# Patient Record
Sex: FEMALE | Race: WHITE | NOT HISPANIC OR LATINO | URBAN - NONMETROPOLITAN AREA
[De-identification: names, ages, dates, MRNs, and addresses within clinical notes are randomized per-mention and may not be internally consistent; named-entity substitution may affect disease eponyms.]

---

## 2020-12-04 ENCOUNTER — DOCTOR'S OFFICE (OUTPATIENT)
Dept: URBAN - NONMETROPOLITAN AREA CLINIC 1 | Facility: CLINIC | Age: 55
Setting detail: OPHTHALMOLOGY
End: 2020-12-04
Payer: COMMERCIAL

## 2020-12-04 ENCOUNTER — RX ONLY (RX ONLY)
Age: 55
End: 2020-12-04

## 2020-12-04 DIAGNOSIS — H40.033: ICD-10-CM

## 2020-12-04 DIAGNOSIS — S05.01XA: ICD-10-CM

## 2020-12-04 PROCEDURE — 92004 COMPRE OPH EXAM NEW PT 1/>: CPT | Performed by: OPHTHALMOLOGY

## 2020-12-04 ASSESSMENT — CONFRONTATIONAL VISUAL FIELD TEST (CVF)
OS_FINDINGS: FULL
OD_FINDINGS: FULL

## 2020-12-04 ASSESSMENT — KERATOMETRY
OD_K1POWER_DIOPTERS: 40.87
OD_AXISANGLE_DEGREES: 9
OS_K2POWER_DIOPTERS: 41.00
OS_AXISANGLE_DEGREES: 133
OS_K1POWER_DIOPTERS: 40.50
OD_K2POWER_DIOPTERS: 41.12

## 2020-12-04 ASSESSMENT — REFRACTION_AUTOREFRACTION
OS_AXIS: 80
OD_AXIS: 70
OD_CYLINDER: -0.75
OS_CYLINDER: -0.75
OD_SPHERE: -0.50
OS_SPHERE: -0.12

## 2020-12-04 ASSESSMENT — VISUAL ACUITY
OS_BCVA: 20/60-2
OD_BCVA: 20/25

## 2020-12-04 ASSESSMENT — SPHEQUIV_DERIVED
OS_SPHEQUIV: -0.495
OD_SPHEQUIV: -0.875

## 2020-12-04 ASSESSMENT — AXIALLENGTH_DERIVED
OD_AL: 24.92
OS_AL: 24.86

## 2020-12-08 ENCOUNTER — DOCTOR'S OFFICE (OUTPATIENT)
Dept: URBAN - NONMETROPOLITAN AREA CLINIC 1 | Facility: CLINIC | Age: 55
Setting detail: OPHTHALMOLOGY
End: 2020-12-08
Payer: COMMERCIAL

## 2020-12-08 ENCOUNTER — RX ONLY (RX ONLY)
Age: 55
End: 2020-12-08

## 2020-12-08 DIAGNOSIS — S05.01XA: ICD-10-CM

## 2020-12-08 PROCEDURE — 65778 COVER EYE W/MEMBRANE: CPT | Performed by: OPHTHALMOLOGY

## 2020-12-08 ASSESSMENT — CONFRONTATIONAL VISUAL FIELD TEST (CVF)
OD_FINDINGS: FULL
OS_FINDINGS: FULL

## 2020-12-08 ASSESSMENT — KERATOMETRY
OD_K1POWER_DIOPTERS: 40.87
OS_K1POWER_DIOPTERS: 40.50
OD_K2POWER_DIOPTERS: 41.12
OD_AXISANGLE_DEGREES: 009
OS_K2POWER_DIOPTERS: 41.00
OS_AXISANGLE_DEGREES: 133

## 2020-12-08 ASSESSMENT — REFRACTION_AUTOREFRACTION
OD_SPHERE: -0.75
OD_CYLINDER: -0.25
OD_AXIS: 091
OS_AXIS: 054
OS_SPHERE: 0.00
OS_CYLINDER: -0.50

## 2020-12-08 ASSESSMENT — VISUAL ACUITY
OS_BCVA: 20/50-1
OD_BCVA: 20/20

## 2020-12-08 ASSESSMENT — SPHEQUIV_DERIVED
OS_SPHEQUIV: -0.25
OD_SPHEQUIV: -0.875

## 2020-12-08 ASSESSMENT — AXIALLENGTH_DERIVED
OD_AL: 24.92
OS_AL: 24.754

## 2020-12-15 ENCOUNTER — DOCTOR'S OFFICE (OUTPATIENT)
Dept: URBAN - NONMETROPOLITAN AREA CLINIC 1 | Facility: CLINIC | Age: 55
Setting detail: OPHTHALMOLOGY
End: 2020-12-15
Payer: COMMERCIAL

## 2020-12-15 DIAGNOSIS — H40.033: ICD-10-CM

## 2020-12-15 DIAGNOSIS — S05.01XD: ICD-10-CM

## 2020-12-15 PROCEDURE — 99024 POSTOP FOLLOW-UP VISIT: CPT | Performed by: OPHTHALMOLOGY

## 2020-12-15 ASSESSMENT — SPHEQUIV_DERIVED
OS_SPHEQUIV: -0.25
OD_SPHEQUIV: -0.875

## 2020-12-15 ASSESSMENT — AXIALLENGTH_DERIVED
OS_AL: 24.754
OD_AL: 24.92

## 2020-12-15 ASSESSMENT — REFRACTION_AUTOREFRACTION
OS_CYLINDER: -0.50
OD_CYLINDER: -0.25
OD_AXIS: 091
OS_SPHERE: 0.00
OD_SPHERE: -0.75
OS_AXIS: 054

## 2020-12-15 ASSESSMENT — KERATOMETRY
OS_K2POWER_DIOPTERS: 41.00
OD_K1POWER_DIOPTERS: 40.87
OD_K2POWER_DIOPTERS: 41.12
OS_AXISANGLE_DEGREES: 133
OS_K1POWER_DIOPTERS: 40.50
OD_AXISANGLE_DEGREES: 009

## 2020-12-15 ASSESSMENT — CONFRONTATIONAL VISUAL FIELD TEST (CVF)
OD_FINDINGS: FULL
OS_FINDINGS: FULL

## 2020-12-15 ASSESSMENT — VISUAL ACUITY
OS_BCVA: 20/60+1
OD_BCVA: 20/25-1

## 2020-12-21 ENCOUNTER — DOCTOR'S OFFICE (OUTPATIENT)
Dept: URBAN - NONMETROPOLITAN AREA CLINIC 1 | Facility: CLINIC | Age: 55
Setting detail: OPHTHALMOLOGY
End: 2020-12-21
Payer: COMMERCIAL

## 2020-12-21 DIAGNOSIS — H40.033: ICD-10-CM

## 2020-12-21 DIAGNOSIS — S05.01XD: ICD-10-CM

## 2020-12-21 PROCEDURE — 92012 INTRM OPH EXAM EST PATIENT: CPT | Performed by: OPHTHALMOLOGY

## 2020-12-21 PROCEDURE — 92020 GONIOSCOPY: CPT | Performed by: OPHTHALMOLOGY

## 2020-12-21 ASSESSMENT — REFRACTION_AUTOREFRACTION
OD_SPHERE: -0.75
OS_AXIS: 054
OS_CYLINDER: -0.50
OD_CYLINDER: -0.25
OS_SPHERE: 0.00
OD_AXIS: 091

## 2020-12-21 ASSESSMENT — CONFRONTATIONAL VISUAL FIELD TEST (CVF)
OS_FINDINGS: FULL
OD_FINDINGS: FULL

## 2020-12-21 ASSESSMENT — SPHEQUIV_DERIVED
OD_SPHEQUIV: -0.875
OS_SPHEQUIV: -0.25

## 2020-12-21 ASSESSMENT — KERATOMETRY
OS_K2POWER_DIOPTERS: 41.00
OD_K1POWER_DIOPTERS: 40.87
OS_AXISANGLE_DEGREES: 133
OS_K1POWER_DIOPTERS: 40.50
OD_K2POWER_DIOPTERS: 41.12
OD_AXISANGLE_DEGREES: 009

## 2020-12-21 ASSESSMENT — VISUAL ACUITY
OD_BCVA: 20/25-1
OS_BCVA: 20/50

## 2020-12-21 ASSESSMENT — AXIALLENGTH_DERIVED
OD_AL: 24.92
OS_AL: 24.754

## 2020-12-22 ENCOUNTER — AMBUL SURGICAL CARE (OUTPATIENT)
Dept: URBAN - NONMETROPOLITAN AREA SURGERY 1 | Facility: SURGERY | Age: 55
Setting detail: OPHTHALMOLOGY
End: 2020-12-22
Payer: COMMERCIAL

## 2020-12-22 DIAGNOSIS — H40.031: ICD-10-CM

## 2020-12-22 PROCEDURE — G8907 PT DOC NO EVENTS ON DISCHARG: HCPCS | Performed by: OPHTHALMOLOGY

## 2020-12-22 PROCEDURE — G8918 PT W/O PREOP ORDER IV AB PRO: HCPCS | Performed by: OPHTHALMOLOGY

## 2020-12-22 PROCEDURE — 66761 REVISION OF IRIS: CPT | Performed by: OPHTHALMOLOGY

## 2021-01-11 ENCOUNTER — AMBUL SURGICAL CARE (OUTPATIENT)
Dept: URBAN - NONMETROPOLITAN AREA SURGERY 1 | Facility: SURGERY | Age: 56
Setting detail: OPHTHALMOLOGY
End: 2021-01-11
Payer: COMMERCIAL

## 2021-01-11 DIAGNOSIS — H40.032: ICD-10-CM

## 2021-01-11 PROCEDURE — G8907 PT DOC NO EVENTS ON DISCHARG: HCPCS | Performed by: OPHTHALMOLOGY

## 2021-01-11 PROCEDURE — G8918 PT W/O PREOP ORDER IV AB PRO: HCPCS | Performed by: OPHTHALMOLOGY

## 2021-01-11 PROCEDURE — 66761 REVISION OF IRIS: CPT | Performed by: OPHTHALMOLOGY

## 2021-02-09 ENCOUNTER — DOCTOR'S OFFICE (OUTPATIENT)
Dept: URBAN - NONMETROPOLITAN AREA CLINIC 1 | Facility: CLINIC | Age: 56
Setting detail: OPHTHALMOLOGY
End: 2021-02-09
Payer: COMMERCIAL

## 2021-02-09 DIAGNOSIS — H40.033: ICD-10-CM

## 2021-02-09 PROBLEM — H40.032 ANATOMICAL NARROW ANGLE; RIGHT EYE, LEFT EYE ;
S/P LPI OU: Status: ACTIVE | Noted: 2020-12-21

## 2021-02-09 PROBLEM — S05.01XD CORNEAL ABRASION; RIGHT EYE SUBSEQUENT ENCOUNTER: Status: RESOLVED | Noted: 2020-12-15 | Resolved: 2021-02-09

## 2021-02-09 PROBLEM — H40.031 ANATOMICAL NARROW ANGLE; RIGHT EYE, LEFT EYE ;
S/P LPI OU: Status: ACTIVE | Noted: 2020-12-21

## 2021-02-09 PROCEDURE — 92012 INTRM OPH EXAM EST PATIENT: CPT | Performed by: OPHTHALMOLOGY

## 2021-02-09 ASSESSMENT — CONFRONTATIONAL VISUAL FIELD TEST (CVF)
OD_FINDINGS: FULL
OS_FINDINGS: FULL

## 2021-02-09 ASSESSMENT — VISUAL ACUITY
OD_BCVA: 20/20-1
OS_BCVA: 20/25+2

## 2021-02-09 ASSESSMENT — AXIALLENGTH_DERIVED
OS_AL: 24.7662
OD_AL: 24.7632

## 2021-02-09 ASSESSMENT — SPHEQUIV_DERIVED
OS_SPHEQUIV: -0.75
OD_SPHEQUIV: -0.625

## 2021-02-09 ASSESSMENT — KERATOMETRY
OD_AXISANGLE_DEGREES: 030
OD_K1POWER_DIOPTERS: 41.00
OS_AXISANGLE_DEGREES: 120
OS_K1POWER_DIOPTERS: 41.00
OD_K2POWER_DIOPTERS: 41.25
OS_K2POWER_DIOPTERS: 41.50

## 2021-02-09 ASSESSMENT — REFRACTION_AUTOREFRACTION
OS_AXIS: 085
OD_SPHERE: -0.25
OD_CYLINDER: -0.75
OS_SPHERE: -0.50
OS_CYLINDER: -0.50
OD_AXIS: 080

## 2021-02-09 ASSESSMENT — TONOMETRY
OD_IOP_MMHG: 14
OS_IOP_MMHG: 16

## 2024-12-02 ENCOUNTER — HOSPITAL ENCOUNTER (EMERGENCY)
Facility: HOSPITAL | Age: 59
Discharge: HOME | End: 2024-12-03
Attending: EMERGENCY MEDICINE
Payer: COMMERCIAL

## 2024-12-02 VITALS
WEIGHT: 146.39 LBS | HEART RATE: 73 BPM | RESPIRATION RATE: 18 BRPM | SYSTOLIC BLOOD PRESSURE: 145 MMHG | TEMPERATURE: 97.2 F | DIASTOLIC BLOOD PRESSURE: 119 MMHG | OXYGEN SATURATION: 99 %

## 2024-12-02 DIAGNOSIS — I82.409 DVT (DEEP VENOUS THROMBOSIS) (HCC): ICD-10-CM

## 2024-12-02 DIAGNOSIS — M79.604 RIGHT LEG PAIN: Primary | ICD-10-CM

## 2024-12-02 PROCEDURE — 99284 EMERGENCY DEPT VISIT MOD MDM: CPT | Performed by: EMERGENCY MEDICINE

## 2024-12-02 PROCEDURE — 85379 FIBRIN DEGRADATION QUANT: CPT | Performed by: EMERGENCY MEDICINE

## 2024-12-02 PROCEDURE — 99283 EMERGENCY DEPT VISIT LOW MDM: CPT

## 2024-12-02 PROCEDURE — 36415 COLL VENOUS BLD VENIPUNCTURE: CPT | Performed by: EMERGENCY MEDICINE

## 2024-12-03 ENCOUNTER — HOSPITAL ENCOUNTER (OUTPATIENT)
Facility: CLINIC | Age: 59
Discharge: HOME | End: 2024-12-03
Payer: COMMERCIAL

## 2024-12-03 DIAGNOSIS — M79.604 RIGHT LEG PAIN: ICD-10-CM

## 2024-12-03 DIAGNOSIS — I82.409 DVT (DEEP VENOUS THROMBOSIS) (HCC): ICD-10-CM

## 2024-12-03 LAB — D DIMER PPP FEU-MCNC: >20 UG/ML FEU

## 2024-12-03 RX ADMIN — APIXABAN 10 MG: 5 TABLET, FILM COATED ORAL at 01:13

## 2024-12-03 NOTE — ED PROVIDER NOTES
Time reflects when diagnosis was documented in both MDM as applicable and the Disposition within this note       Time User Action Codes Description Comment    12/3/2024  1:05 AM Carmen Combs [M79.604] Right leg pain     12/3/2024  1:06 AM Carmen Combs [I82.409] DVT (deep venous thrombosis) (HCC)           ED Disposition       ED Disposition   Discharge    Condition   Stable    Date/Time   Tue Dec 3, 2024  1:05 AM    Comment   Jannie Ulloa discharge to home/self care.                   Assessment & Plan       Medical Decision Making  Patient presents with initial presentation for right lower extremity pain.  There is sensitivity/slight tenderness to palpate the calf..  No lymphangitic spread visible and no fluid pockets or fluctuance concerning for abscess noted.  Low concern for osteomyelitis.  No immune compromise, bullae, pain out of proportion, or rapid progression concerning for necrotizing fasciitis.  No bony deformity, no rash, lesions, ecchymosis.  Findings concerning for possible DVT, patient provided with 1 dose of anticoagulant and instructions to return tomorrow for lower extremity venous Doppler.  Call was placed to the vascular department requesting they contact patient to set this up.  Patient was advised to return if symptoms worsen or any additional concerns.    Problems Addressed:  DVT (deep venous thrombosis) (HCC): acute illness or injury  Right leg pain: acute illness or injury    Amount and/or Complexity of Data Reviewed  Labs: ordered. Decision-making details documented in ED Course.    Risk  OTC drugs.  Prescription drug management.        ED Course as of 12/03/24 0132   Tue Dec 03, 2024   0056 D-dimer, quantitative(!)   0130 Patient's D-dimer elevated, I did inquire again as to whether or not she was experiencing any chest pain or shortness of breath, she again denied it, vital signs are stable with no hypoxia or tachycardia, advised patient that we will start her on  anticoagulation and recommend she return tomorrow for outpatient DVT study, I placed a call to the vascular department requesting that they call patient to schedule this ASAP, patient advised close follow-up with her PCP as well       Medications   apixaban (ELIQUIS) tablet 10 mg (10 mg Oral Given 12/3/24 0113)       ED Risk Strat Scores                           SBIRT 22yo+      Flowsheet Row Most Recent Value   Initial Alcohol Screen: US AUDIT-C     1. How often do you have a drink containing alcohol? 0 Filed at: 2024   2. How many drinks containing alcohol do you have on a typical day you are drinking?  0 Filed at: 2024   3a. Male UNDER 65: How often do you have five or more drinks on one occasion? 0 Filed at: 2024   3b. FEMALE Any Age, or MALE 65+: How often do you have 4 or more drinks on one occassion? 0 Filed at: 2024   Audit-C Score 0 Filed at: 2024   KELLI: How many times in the past year have you...    Used an illegal drug or used a prescription medication for non-medical reasons? Never Filed at: 2024                            History of Present Illness       Chief Complaint   Patient presents with    Leg Pain     Has been having on going right leg pain for 2 weeks, started in her calf and now does not have normal movement, has swelling iin the ankle. Was seen by PCP       Past Medical History:   Diagnosis Date    Disease of thyroid gland     RA (rheumatoid arthritis) (HCC)       Past Surgical History:   Procedure Laterality Date     SECTION        History reviewed. No pertinent family history.   Social History     Tobacco Use    Smoking status: Every Day     Types: Cigarettes    Smokeless tobacco: Never   Vaping Use    Vaping status: Unknown   Substance Use Topics    Alcohol use: Never    Drug use: Never      E-Cigarette/Vaping    E-Cigarette Use Unknown If Ever Used       E-Cigarette/Vaping Substances    Nicotine No     THC No      CBD No     Flavoring No     Other No     Unknown No       I have reviewed and agree with the history as documented.     Patient is a 59-year-old female presenting to the emergency department complaining of right calf pain with lower leg swelling that is been worsening over the past 2 weeks, she denies any injury or trauma, no chest pain or shortness of breath, no history of similar symptoms previously, she did mention it to her PCP about a week ago and was told it was likely related to her RA but she reports that the symptoms are different than any RA flare she has had in the past        Review of Systems   Constitutional: Negative.    HENT: Negative.     Eyes: Negative.    Respiratory: Negative.     Cardiovascular:  Positive for leg swelling.   Gastrointestinal: Negative.    Endocrine: Negative.    Genitourinary: Negative.    Musculoskeletal:  Positive for myalgias.   Skin: Negative.    Allergic/Immunologic: Negative.    Neurological: Negative.    Hematological: Negative.    Psychiatric/Behavioral: Negative.             Objective       ED Triage Vitals [12/02/24 2315]   Temperature Pulse Blood Pressure Respirations SpO2 Patient Position - Orthostatic VS   (!) 97.2 °F (36.2 °C) 73 (!) 145/119 18 99 % Sitting      Temp src Heart Rate Source BP Location FiO2 (%) Pain Score    -- -- Left arm -- 8      Vitals      Date and Time Temp Pulse SpO2 Resp BP Pain Score FACES Pain Rating User   12/03/24 0114 -- -- -- -- -- 5 -- AD   12/02/24 2315 97.2 °F (36.2 °C) 73 99 % 18 145/119 8 -- SV            Physical Exam  Constitutional:       Appearance: She is well-developed.   HENT:      Head: Normocephalic and atraumatic.   Eyes:      Conjunctiva/sclera: Conjunctivae normal.      Pupils: Pupils are equal, round, and reactive to light.   Cardiovascular:      Rate and Rhythm: Normal rate.   Pulmonary:      Effort: Pulmonary effort is normal.   Abdominal:      Palpations: Abdomen is soft.   Musculoskeletal:         General: Normal  range of motion.      Cervical back: Normal range of motion and neck supple.        Legs:       Comments: Right calf tenderness, mild swelling to the lower leg, nonpitting edema, distal sensation and motor is intact, negative Homans' sign   Skin:     General: Skin is warm and dry.   Neurological:      Mental Status: She is alert and oriented to person, place, and time.         Results Reviewed       Procedure Component Value Units Date/Time    D-dimer, quantitative [[id number]]  (Abnormal) Collected: 12/02/24 2331    Lab Status: Final result Specimen: Blood from Arm, Left Updated: 12/03/24 0056     D-Dimer, Quant >20.00 ug/ml FEU     Narrative:      In the evaluation for possible pulmonary embolism, in the appropriate (Well's Score of 4 or less) patient, the age adjusted d-dimer cutoff for this patient can be calculated as:    Age x 0.01 (in ug/mL) for Age-adjusted D-dimer exclusion threshold for a patient over 50 years.            VAS VENOUS DUPLEX -LOWER LIMB UNILATERAL    (Results Pending)       Procedures    ED Medication and Procedure Management   None     Discharge Medication List as of 12/3/2024  1:07 AM        START taking these medications    Details   apixaban (Eliquis) 5 mg Multiple Dosages:Starting Tue 12/3/2024, Until Mon 12/9/2024 at 2359, THEN Starting Tue 12/10/2024, Until Wed 1/1/2025 at 2359Take 2 tablets (10 mg total) by mouth 2 (two) times a day for 7 days, THEN 1 tablet (5 mg total) 2 (two) times a day for 23 d ays., Normal           Outpatient Discharge Orders   VAS VENOUS DUPLEX -LOWER LIMB UNILATERAL   Standing Status: Future Standing Exp. Date: 12/03/28     ED SEPSIS DOCUMENTATION   Time reflects when diagnosis was documented in both MDM as applicable and the Disposition within this note       Time User Action Codes Description Comment    12/3/2024  1:05 AM Carmen Combs Add [M79.604] Right leg pain     12/3/2024  1:06 AM Carmen Combs [I82.409] DVT (deep venous thrombosis) ([facility])                   Carmen Combs DO  12/03/24 0132

## 2024-12-04 PROCEDURE — 93971 EXTREMITY STUDY: CPT | Performed by: SURGERY
